# Patient Record
Sex: MALE | URBAN - METROPOLITAN AREA
[De-identification: names, ages, dates, MRNs, and addresses within clinical notes are randomized per-mention and may not be internally consistent; named-entity substitution may affect disease eponyms.]

---

## 2024-08-14 ENCOUNTER — OFFICE VISIT (OUTPATIENT)
Dept: URBAN - METROPOLITAN AREA CLINIC 23 | Facility: CLINIC | Age: 89
End: 2024-08-14

## 2024-08-14 ENCOUNTER — OFFICE VISIT (OUTPATIENT)
Dept: URBAN - METROPOLITAN AREA CLINIC 111 | Facility: CLINIC | Age: 89
End: 2024-08-14

## 2024-08-14 NOTE — HPI-TODAY'S VISIT:
Referral for chronic abdominal pain and constipation, does not wish for endoscopy. On chronic opioids

## 2024-08-16 ENCOUNTER — OFFICE VISIT (OUTPATIENT)
Dept: URBAN - METROPOLITAN AREA CLINIC 23 | Facility: CLINIC | Age: 89
End: 2024-08-16
Payer: OTHER GOVERNMENT

## 2024-08-16 ENCOUNTER — TELEPHONE ENCOUNTER (OUTPATIENT)
Dept: URBAN - METROPOLITAN AREA CLINIC 5 | Facility: CLINIC | Age: 89
End: 2024-08-16

## 2024-08-16 ENCOUNTER — DASHBOARD ENCOUNTERS (OUTPATIENT)
Age: 89
End: 2024-08-16

## 2024-08-16 VITALS
HEART RATE: 72 BPM | BODY MASS INDEX: 26.81 KG/M2 | SYSTOLIC BLOOD PRESSURE: 142 MMHG | DIASTOLIC BLOOD PRESSURE: 73 MMHG | WEIGHT: 181 LBS | HEIGHT: 69 IN | TEMPERATURE: 98.4 F

## 2024-08-16 DIAGNOSIS — R14.0 ABDOMINAL BLOATING: ICD-10-CM

## 2024-08-16 DIAGNOSIS — K59.09 CHRONIC CONSTIPATION: ICD-10-CM

## 2024-08-16 PROCEDURE — 99204 OFFICE O/P NEW MOD 45 MIN: CPT | Performed by: STUDENT IN AN ORGANIZED HEALTH CARE EDUCATION/TRAINING PROGRAM
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
## 2024-08-18 LAB
A/G RATIO: 1.6
ABSOLUTE BASOPHILS: 64
ABSOLUTE EOSINOPHILS: 202
ABSOLUTE LYMPHOCYTES: 4480
ABSOLUTE MONOCYTES: 846
ABSOLUTE NEUTROPHILS: 3606
ALBUMIN: 4.6
ALKALINE PHOSPHATASE: 57
ALT (SGPT): 29
AST (SGOT): 23
BASOPHILS: 0.7
BILIRUBIN, TOTAL: 0.5
BUN/CREATININE RATIO: (no result)
BUN: 19
CALCIUM: 9.6
CARBON DIOXIDE, TOTAL: 26
CHLORIDE: 97
CREATININE: 1.11
EGFR: 64
EOSINOPHILS: 2.2
GLOBULIN, TOTAL: 2.8
GLUCOSE: 128
HEMATOCRIT: 43
HEMOGLOBIN: 14.7
IMMUNOGLOBULIN A, QN, SERUM: 280
INTERPRETATION: (no result)
LYMPHOCYTES: 48.7
MCH: 32.5
MCHC: 34.2
MCV: 94.9
MONOCYTES: 9.2
MPV: 9.3
NEUTROPHILS: 39.2
PLATELET COUNT: 359
POTASSIUM: 4.4
PROTEIN, TOTAL: 7.4
RDW: 12.4
RED BLOOD CELL COUNT: 4.53
SODIUM: 134
T-TRANSGLUTAMINASE (TTG) IGA: <1
TSH: 1.02
WHITE BLOOD CELL COUNT: 9.2

## 2024-09-17 ENCOUNTER — OFFICE VISIT (OUTPATIENT)
Dept: URBAN - METROPOLITAN AREA CLINIC 23 | Facility: CLINIC | Age: 89
End: 2024-09-17
Payer: OTHER GOVERNMENT

## 2024-09-17 VITALS
HEIGHT: 69 IN | BODY MASS INDEX: 26.96 KG/M2 | SYSTOLIC BLOOD PRESSURE: 171 MMHG | HEART RATE: 64 BPM | WEIGHT: 182 LBS | TEMPERATURE: 97.5 F | DIASTOLIC BLOOD PRESSURE: 63 MMHG

## 2024-09-17 DIAGNOSIS — R14.0 ABDOMINAL BLOATING: ICD-10-CM

## 2024-09-17 DIAGNOSIS — K59.09 CHRONIC CONSTIPATION: ICD-10-CM

## 2024-09-17 PROCEDURE — 99213 OFFICE O/P EST LOW 20 MIN: CPT | Performed by: STUDENT IN AN ORGANIZED HEALTH CARE EDUCATION/TRAINING PROGRAM
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
## 2024-09-17 NOTE — HPI-TODAY'S VISIT:
Mr. López is an 89-year-old man who presents as a return patient for evaluation of constipation and abdominal bloating.  He reports that his constipation is significantly improved with as needed MiraLAX, he is taking at least 1 daily, sometimes 2 pending his symptoms.  He has had modest improvement in abdominal bloating bloating with improvement in bowel movement frequency, but still reports some trouble some bloating symptoms.  Tried removing dairy from his diet without much improvement. Does report a significant amount of artificial sweetener intake with breafast and with sweet tea

## 2024-09-20 ENCOUNTER — OFFICE VISIT (OUTPATIENT)
Dept: URBAN - METROPOLITAN AREA CLINIC 23 | Facility: CLINIC | Age: 89
End: 2024-09-20

## 2024-10-15 ENCOUNTER — OFFICE VISIT (OUTPATIENT)
Dept: URBAN - METROPOLITAN AREA CLINIC 23 | Facility: CLINIC | Age: 89
End: 2024-10-15
Payer: OTHER GOVERNMENT

## 2024-10-15 VITALS
TEMPERATURE: 97.8 F | BODY MASS INDEX: 26.66 KG/M2 | SYSTOLIC BLOOD PRESSURE: 149 MMHG | WEIGHT: 180 LBS | HEIGHT: 69 IN | HEART RATE: 67 BPM | DIASTOLIC BLOOD PRESSURE: 69 MMHG

## 2024-10-15 DIAGNOSIS — K59.09 CHRONIC CONSTIPATION: ICD-10-CM

## 2024-10-15 DIAGNOSIS — R14.0 ABDOMINAL BLOATING: ICD-10-CM

## 2024-10-15 DIAGNOSIS — K30 DELAYED GASTRIC EMPTYING: ICD-10-CM

## 2024-10-15 PROCEDURE — 99214 OFFICE O/P EST MOD 30 MIN: CPT | Performed by: STUDENT IN AN ORGANIZED HEALTH CARE EDUCATION/TRAINING PROGRAM

## 2024-10-15 RX ORDER — METOCLOPRAMIDE 5 MG/1
1 TABLET BEFORE MEALS TABLET ORAL
Qty: 60 | Refills: 3 | OUTPATIENT
Start: 2024-10-15
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
## 2024-10-15 NOTE — HPI-TODAY'S VISIT:
While 89-year-old man who presents as an established patient for follow-up of symptoms.  He had no change in his symptoms with eliminating artificial sweeteners.  He continues to move his bowels regularly.  He reports that he was discussing with his primary care doctor risks versus benefits of metoclopramide and would like to consider this medication.  He was prescribed this in the past and possibly diagnosed with delayed gastric emptying and that it significantly improved his symptoms.

## 2024-11-19 ENCOUNTER — OFFICE VISIT (OUTPATIENT)
Dept: URBAN - METROPOLITAN AREA CLINIC 23 | Facility: CLINIC | Age: 89
End: 2024-11-19
Payer: OTHER GOVERNMENT

## 2024-11-19 VITALS
SYSTOLIC BLOOD PRESSURE: 158 MMHG | HEART RATE: 68 BPM | BODY MASS INDEX: 27.7 KG/M2 | DIASTOLIC BLOOD PRESSURE: 69 MMHG | TEMPERATURE: 97.2 F | WEIGHT: 187 LBS | HEIGHT: 69 IN

## 2024-11-19 DIAGNOSIS — K59.09 CHRONIC CONSTIPATION: ICD-10-CM

## 2024-11-19 DIAGNOSIS — K30 DELAYED GASTRIC EMPTYING: ICD-10-CM

## 2024-11-19 DIAGNOSIS — R14.0 ABDOMINAL BLOATING: ICD-10-CM

## 2024-11-19 PROCEDURE — 99214 OFFICE O/P EST MOD 30 MIN: CPT | Performed by: STUDENT IN AN ORGANIZED HEALTH CARE EDUCATION/TRAINING PROGRAM

## 2024-11-19 RX ORDER — METOCLOPRAMIDE 5 MG/1
1 TABLET BEFORE MEALS TABLET ORAL
Qty: 60 | Refills: 3 | Status: ACTIVE | COMMUNITY
Start: 2024-10-15

## 2024-11-19 RX ORDER — METOCLOPRAMIDE 5 MG/1
1 TABLET BEFORE MEALS TABLET ORAL
Qty: 60 | Refills: 3 | OUTPATIENT
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
## 2024-11-19 NOTE — HPI-TODAY'S VISIT:
- Presents with ongoing bloating and gas symptoms, reporting continued bloating despite dietary modifications - Reports bloating occurs with eating any type of food - Denies carbonated beverage consumption, gum chewing, or air gulping habits - History of internal injuries requiring surgical repair with repositioning of abdominal contents from chest cavity - Currently using Reglan with some relief of symptoms - Reports improvement in bowel movements: having daily bowel movements without MiraLAX for the past week - Describes bowel movements as large but passable, with successful movement this morning - Has tried dietary modifications and food elimination without clear identification of triggers

## 2024-11-21 ENCOUNTER — TELEPHONE ENCOUNTER (OUTPATIENT)
Dept: URBAN - METROPOLITAN AREA CLINIC 5 | Facility: CLINIC | Age: 89
End: 2024-11-21

## 2025-02-17 ENCOUNTER — OFFICE VISIT (OUTPATIENT)
Dept: URBAN - METROPOLITAN AREA CLINIC 23 | Facility: CLINIC | Age: 89
End: 2025-02-17
Payer: OTHER GOVERNMENT

## 2025-02-17 VITALS
TEMPERATURE: 97.7 F | SYSTOLIC BLOOD PRESSURE: 151 MMHG | HEART RATE: 71 BPM | WEIGHT: 197 LBS | BODY MASS INDEX: 29.18 KG/M2 | HEIGHT: 69 IN | DIASTOLIC BLOOD PRESSURE: 71 MMHG

## 2025-02-17 DIAGNOSIS — K30 DELAYED GASTRIC EMPTYING: ICD-10-CM

## 2025-02-17 DIAGNOSIS — K59.09 CHRONIC CONSTIPATION: ICD-10-CM

## 2025-02-17 DIAGNOSIS — R14.0 ABDOMINAL BLOATING: ICD-10-CM

## 2025-02-17 PROBLEM — 236069009: Status: ACTIVE | Noted: 2025-02-17

## 2025-02-17 PROBLEM — 116289008: Status: ACTIVE | Noted: 2025-02-17

## 2025-02-17 PROCEDURE — 99214 OFFICE O/P EST MOD 30 MIN: CPT | Performed by: STUDENT IN AN ORGANIZED HEALTH CARE EDUCATION/TRAINING PROGRAM

## 2025-02-17 RX ORDER — METRONIDAZOLE 250 MG/1
1 TABLET TABLET ORAL THREE TIMES A DAY
Qty: 42 TABLET | Refills: 0 | OUTPATIENT
Start: 2025-02-17 | End: 2025-03-03

## 2025-02-17 RX ORDER — METOCLOPRAMIDE 5 MG/1
1 TABLET BEFORE MEALS TABLET ORAL
Qty: 60 | Refills: 3 | Status: ACTIVE | COMMUNITY

## 2025-02-17 RX ORDER — METOCLOPRAMIDE 5 MG/1
1 TABLET BEFORE MEALS TABLET ORAL
Qty: 60 | Refills: 3 | OUTPATIENT

## 2025-02-17 NOTE — HPI-MIGRATED HPI
Last OV - Presents with ongoing bloating and gas symptoms, reporting continued bloating despite dietary modifications - Reports bloating occurs with eating any type of food - Denies carbonated beverage consumption, gum chewing, or air gulping habits - History of internal injuries requiring surgical repair with repositioning of abdominal contents from chest cavity - Currently using Reglan with some relief of symptoms - Reports improvement in bowel movements: having daily bowel movements without MiraLAX for the past week - Describes bowel movements as large but passable, with successful movement this morning - Has tried dietary modifications and food elimination without clear identification of triggers
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
## 2025-03-21 ENCOUNTER — OFFICE VISIT (OUTPATIENT)
Dept: URBAN - METROPOLITAN AREA CLINIC 23 | Facility: CLINIC | Age: 89
End: 2025-03-21
Payer: OTHER GOVERNMENT

## 2025-03-21 DIAGNOSIS — K30 DELAYED GASTRIC EMPTYING: ICD-10-CM

## 2025-03-21 DIAGNOSIS — R14.0 ABDOMINAL BLOATING: ICD-10-CM

## 2025-03-21 DIAGNOSIS — K59.09 CHRONIC CONSTIPATION: ICD-10-CM

## 2025-03-21 PROCEDURE — 99214 OFFICE O/P EST MOD 30 MIN: CPT | Performed by: STUDENT IN AN ORGANIZED HEALTH CARE EDUCATION/TRAINING PROGRAM

## 2025-03-21 RX ORDER — METOCLOPRAMIDE 5 MG/1
1 TABLET BEFORE MEALS TABLET ORAL
OUTPATIENT

## 2025-03-21 RX ORDER — METOCLOPRAMIDE 5 MG/1
1 TABLET BEFORE MEALS TABLET ORAL
Qty: 60 | Refills: 3 | Status: ACTIVE | COMMUNITY
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
## 2025-05-15 ENCOUNTER — OFFICE VISIT (OUTPATIENT)
Dept: URBAN - METROPOLITAN AREA CLINIC 23 | Facility: CLINIC | Age: 89
End: 2025-05-15
Payer: OTHER GOVERNMENT

## 2025-05-15 DIAGNOSIS — R14.0 ABDOMINAL BLOATING: ICD-10-CM

## 2025-05-15 DIAGNOSIS — K30 DELAYED GASTRIC EMPTYING: ICD-10-CM

## 2025-05-15 DIAGNOSIS — K59.09 CHRONIC CONSTIPATION: ICD-10-CM

## 2025-05-15 PROCEDURE — 99214 OFFICE O/P EST MOD 30 MIN: CPT | Performed by: STUDENT IN AN ORGANIZED HEALTH CARE EDUCATION/TRAINING PROGRAM

## 2025-05-15 RX ORDER — CHLORDIAZEPOXIDE HYDROCHLORIDE AND CLIDINIUM BROMIDE 5; 2.5 MG/1; MG/1
1 CAPSULE BEFORE MEALS CAPSULE ORAL TWICE A DAY
Qty: 60 | Refills: 0 | OUTPATIENT
Start: 2025-05-15
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
## 2025-05-15 NOTE — HPI-TODAY'S VISIT:
- Mr. López presents for follow-up regarding chronic gastrointestinal symptoms. He reports feeling "great" for the first time in many years due to recently starting a medication that has provided significant relief. - History of chronic abdominal pain, cramping, and alternating bowel habits. Previously experienced chronic diarrhea which has now changed to constipation. Reports severe pain and cramping that has been completely eliminated with recent medication trial. 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 This has caused longstanding shortness of breath with exertion since his half-way in 1990. - Previous extensive workup for GI symptoms including intestinal biopsies at [facility], where he was hospitalized for five days. Reports a gastroenterologist there recommended surgery for suspected blockage, but a surgeon disagreed and declined to operate. - Reports a [facility] physician later prescribed a medication (identified as chlordiazepoxide, Librax) that completely resolved his symptoms. - Recent ER visit resulted in abdominal scan on 04/29/2025 that showed areas of intestinal narrowing that may appear as partial blockages. ER treatment included a "cocktail" containing lidocaine, Maalox, and Donnatal which provided temporary relief for 2-3 hours. - Currently has only two pills of librax remaining. States the medication has completely eliminated his pain, cramping, and other GI symptoms. - Past medical history includes tinnitus, for which he previously took diazepam 5mg. - Current medications include zolpidem, pregabalin, methocarbamol, and naproxen.

## 2025-05-18 ENCOUNTER — WEB ENCOUNTER (OUTPATIENT)
Dept: URBAN - METROPOLITAN AREA CLINIC 23 | Facility: CLINIC | Age: 89
End: 2025-05-18

## 2025-05-22 ENCOUNTER — WEB ENCOUNTER (OUTPATIENT)
Dept: URBAN - METROPOLITAN AREA CLINIC 23 | Facility: CLINIC | Age: 89
End: 2025-05-22

## 2025-05-22 RX ORDER — DICYCLOMINE HYDROCHLORIDE 20 MG/1
1 TABLET TABLET ORAL THREE TIMES A DAY
Qty: 270 TABLET | Refills: 3 | OUTPATIENT
Start: 2025-05-23

## 2025-05-23 ENCOUNTER — OFFICE VISIT (OUTPATIENT)
Dept: URBAN - METROPOLITAN AREA CLINIC 23 | Facility: CLINIC | Age: 89
End: 2025-05-23

## 2025-05-27 ENCOUNTER — WEB ENCOUNTER (OUTPATIENT)
Dept: URBAN - METROPOLITAN AREA CLINIC 23 | Facility: CLINIC | Age: 89
End: 2025-05-27

## 2025-05-27 RX ORDER — DICYCLOMINE HYDROCHLORIDE 20 MG/1
1 TABLET TABLET ORAL THREE TIMES A DAY
Qty: 270 TABLET | Refills: 3
Start: 2025-05-23

## 2025-05-27 RX ORDER — METOCLOPRAMIDE 5 MG/1
1 TABLET BEFORE MEALS TABLET ORAL
Qty: 90 TABLET | Refills: 1

## 2025-05-29 PROBLEM — 162031009: Status: ACTIVE | Noted: 2025-05-29

## 2025-06-05 ENCOUNTER — TELEPHONE ENCOUNTER (OUTPATIENT)
Dept: URBAN - METROPOLITAN AREA CLINIC 23 | Facility: CLINIC | Age: 89
End: 2025-06-05

## 2025-06-11 ENCOUNTER — TELEPHONE ENCOUNTER (OUTPATIENT)
Dept: URBAN - METROPOLITAN AREA CLINIC 5 | Facility: CLINIC | Age: 89
End: 2025-06-11

## 2025-06-11 RX ORDER — METOCLOPRAMIDE 5 MG/1
1 TABLET BEFORE MEALS TABLET ORAL
Qty: 90 TABLET | Refills: 1

## 2025-08-12 ENCOUNTER — OFFICE VISIT (OUTPATIENT)
Dept: URBAN - METROPOLITAN AREA CLINIC 23 | Facility: CLINIC | Age: 89
End: 2025-08-12
Payer: OTHER GOVERNMENT

## 2025-08-12 DIAGNOSIS — K59.09 CHRONIC CONSTIPATION: ICD-10-CM

## 2025-08-12 DIAGNOSIS — R14.0 ABDOMINAL BLOATING: ICD-10-CM

## 2025-08-12 DIAGNOSIS — K30 DELAYED GASTRIC EMPTYING: ICD-10-CM

## 2025-08-12 PROCEDURE — 99214 OFFICE O/P EST MOD 30 MIN: CPT | Performed by: STUDENT IN AN ORGANIZED HEALTH CARE EDUCATION/TRAINING PROGRAM

## 2025-08-12 RX ORDER — CHLORDIAZEPOXIDE HYDROCHLORIDE AND CLIDINIUM BROMIDE 5; 2.5 MG/1; MG/1
1 CAPSULE BEFORE MEALS CAPSULE ORAL TWICE A DAY
Qty: 60 | Refills: 0 | Status: ACTIVE | COMMUNITY
Start: 2025-05-15

## 2025-08-12 RX ORDER — CHLORDIAZEPOXIDE HYDROCHLORIDE AND CLIDINIUM BROMIDE 5; 2.5 MG/1; MG/1
1 CAPSULE BEFORE MEALS CAPSULE ORAL TWICE A DAY
Qty: 180 CAPSULE | Refills: 0 | OUTPATIENT
Start: 2025-08-12

## 2025-08-12 RX ORDER — DICYCLOMINE HYDROCHLORIDE 20 MG/1
1 TABLET TABLET ORAL THREE TIMES A DAY
Qty: 270 TABLET | Refills: 3 | Status: ACTIVE | COMMUNITY
Start: 2025-05-23

## 2025-08-12 RX ORDER — METOCLOPRAMIDE 5 MG/1
1 TABLET BEFORE MEALS TABLET ORAL
Qty: 90 TABLET | Refills: 1 | Status: ACTIVE | COMMUNITY

## 2025-08-18 ENCOUNTER — TELEPHONE ENCOUNTER (OUTPATIENT)
Dept: URBAN - METROPOLITAN AREA CLINIC 5 | Facility: CLINIC | Age: 89
End: 2025-08-18

## 2025-08-22 ENCOUNTER — TELEPHONE ENCOUNTER (OUTPATIENT)
Dept: URBAN - METROPOLITAN AREA CLINIC 5 | Facility: CLINIC | Age: 89
End: 2025-08-22

## 2025-08-25 ENCOUNTER — WEB ENCOUNTER (OUTPATIENT)
Dept: URBAN - METROPOLITAN AREA CLINIC 23 | Facility: CLINIC | Age: 89
End: 2025-08-25

## 2025-08-25 RX ORDER — CHLORDIAZEPOXIDE HYDROCHLORIDE AND CLIDINIUM BROMIDE 5; 2.5 MG/1; MG/1
1 CAPSULE BEFORE MEALS CAPSULE ORAL TWICE A DAY
Qty: 180 CAPSULE | Refills: 0 | OUTPATIENT
Start: 2025-08-25